# Patient Record
(demographics unavailable — no encounter records)

---

## 2024-10-28 NOTE — HISTORY OF PRESENT ILLNESS
[Left Arm] : left arm [7] : 7 [Sharp] : sharp [Constant] : constant [de-identified] : 10/28/24: Pt reports feeling better since last visit, but pain is still present.  9/30/24 rhd 41 yo male is here today for pain in LT Thumb. PT states that he was sparring and tried to block a kick with his LT hand. He heard a crack when he got hit. Injury happened about 5 days ago. (9/26/2024)  PMH: hyperlipidemia, sleep apnea, migraines Allergies: NKDA   10/28/24 pain with trying to bend thumb, mobility improvement,   [] : no [FreeTextEntry1] : LT Thumb

## 2024-10-28 NOTE — ASSESSMENT
[FreeTextEntry1] : The patient was advised of the diagnosis. The natural history of the pathology was explained in full to the patient in layman's terms. All questions were answered. The risks and benefits of surgical and non-surgical treatment alternatives were explained in full to the patient.  CSI and surgery option was discussed   We reviewed the anatomy of the flexor sheath and pathology of trigger fingers with the use of drawings and discussion.  We discussed the treatment options including splinting/nsaids, injection and surgery.  We discussed that too many injections may lead to weakening of the tendon/tendon rupture and the safety of two injections. After a discussion of the risks, benefits and alternatives along with the expectations, the patient was amenable to injection.  The indication for injection is pain and inflammation.  The skin overlying the tendon sheath/A1 pulley site was prepared with alcohol and ethyl chloride was sprayed topically.  Sterile technique was used. An injection of 1ml of lidocaine and 6mg of betamethasone was used.  The patient was instructed to call if redness, pain or fever occur.  They may apply ice for 15 minutes every hour for the next 12-24 hours as tolerated.  The patient understands that it may take 2-5 days to see a noticeable difference.  Sterile Band-Aid was applied.  CSI#1 given in the left thumb trigger finger  RTO 4 weeks

## 2025-06-25 NOTE — PHYSICAL EXAM
[FreeTextEntry1] : JORGE LUIS [] : septum deviated to the right [de-identified] : hypertrophy [Midline] : trachea located in midline position [Normal] : no rashes

## 2025-06-25 NOTE — HISTORY OF PRESENT ILLNESS
[de-identified] : 43 year old male presents with initial evaluation of sleep apnea  History of HLD  Referred by Dr. Erwin Devlin.   Last sleep study conducted 15 years ago.  Patient has a BMI of 30.81kg/m2 Patient has tried a CPAP machine without relief. Complains of removing mask during sleep Patient has tried various types of masks without relief States mask cause claustrophobia, skin irritation, dyspnea, and dry mouth States has tried using a dental appliance without relief Patient has sleeping partner who complains about the noise of CPAP machine, stating it keeps them awake at night. Patient states tried losing weight in the past with no relief from sleep apnea Patient comorbidities include weight gain, daytime fatigue, and difficulty driving.  Has not seen Pulm.   [Snoring] : snoring [Witnessed Apneas] : witnessed sleep apnea [Frequent Nocturnal Awakening] : no nocturnal awakening [Daytime Somnolence] : no daytime somnolence [Unintentional Sleep while Active] : no unintentional sleep while active [Unintentional Sleep While Inactive] : no unintentional sleep while inactive [Awakes Unrefreshed] : awakening unrefreshed [Awakes with Headache] : no headache upon awakening [Awakening With Dry Mouth] : no dry mouth upon awakening [Recent  Weight Gain] : recent weight gain

## 2025-06-25 NOTE — PROCEDURE
[Image(s) Captured] : image(s) captured and filed [Video Captured] : video captured and filed [Unable to Cooperate with Mirror] : patient unable to cooperate with mirror [Complicated Symptoms] : complicated symptoms requiring more thorough examination than provided by mirror [Topical Lidocaine] : topical lidocaine [Oxymetazoline HCl] : oxymetazoline HCl [Flexible Endoscope] : examined with the flexible endoscope [Serial Number: ___] : Serial Number: [unfilled] [Normal] : posterior cricoid area had healthy pink mucosa in the interarytenoid area and the esophageal inlet [de-identified] : Patient was placed in the examination chair in a sitting position. The nose was decongested with oxymetazoline nasal solution. The airway was anesthetized with 4% Xylocaine.  The back of the throat was anesthetized with Cetacaine. Direct flexible/rigid video endoscopy was performed. Findings revealed: Mildly deviated septum to the right, turbinate hypertrophy. Positive Coleman maneuver, thick base of tongue. Larynx, epiglottis, and vocal cords are normal.

## 2025-06-25 NOTE — ADDENDUM
[FreeTextEntry1] : Scribe Attestation: I, Earl Mendoza, am scribing for and in the presence of Dr. Bernardo Gill in the following sections HISTORY OF PRESENT ILLNESS, PAST MEDICAL/FAMILY/SOCIAL HISTORY; REVIEW OF SYSTEMS; VITAL SIGNS; PHYSICAL EXAM; PROCEDURES; DISCUSSION.   I, Dr. Bernardo Gill, personally performed the services described in the documentation, reviewed the documentation recorded by the scribe in my presence, and it accurately and completely records my words and actions.

## 2025-06-25 NOTE — CONSULT LETTER
[Consult Letter:] : I had the pleasure of evaluating your patient, [unfilled]. [Please see my note below.] : Please see my note below. [Consult Closing:] : Thank you very much for allowing me to participate in the care of this patient.  If you have any questions, please do not hesitate to contact me. [Sincerely,] : Sincerely, [FreeTextEntry3] : Bernardo Gill MD, ANYA, FACS  Department Otolaryngology Director of [facility] Professor of Otolaryngology, [facility] [place] [employer] [facility]